# Patient Record
Sex: FEMALE
[De-identification: names, ages, dates, MRNs, and addresses within clinical notes are randomized per-mention and may not be internally consistent; named-entity substitution may affect disease eponyms.]

---

## 2018-03-12 LAB
ADD MANUAL DIFF: NO
ALBUMIN SERPL-MCNC: 4.1 G/DL (ref 3.4–5)
ALBUMIN/GLOB SERPL: 1.1 {RATIO} (ref 1–2.7)
ALP SERPL-CCNC: 89 U/L (ref 46–116)
ALT SERPL-CCNC: 34 U/L (ref 12–78)
ANION GAP SERPL CALC-SCNC: 6 MMOL/L (ref 5–15)
APPEARANCE UR: CLEAR
APTT BLD: 27 SEC (ref 23–33)
APTT PPP: (no result) S
AST SERPL-CCNC: 15 U/L (ref 15–37)
BASOPHILS NFR BLD AUTO: 0.9 % (ref 0–2)
BILIRUB SERPL-MCNC: 0.3 MG/DL (ref 0.2–1)
BUN SERPL-MCNC: 14 MG/DL (ref 7–18)
CALCIUM SERPL-MCNC: 9.6 MG/DL (ref 8.5–10.1)
CHLORIDE SERPL-SCNC: 102 MMOL/L (ref 98–107)
CO2 SERPL-SCNC: 32 MMOL/L (ref 21–32)
CREAT SERPL-MCNC: 0.9 MG/DL (ref 0.55–1.3)
EOSINOPHIL NFR BLD AUTO: 9.7 % (ref 0–3)
ERYTHROCYTE [DISTWIDTH] IN BLOOD BY AUTOMATED COUNT: 11.2 % (ref 11.6–14.8)
GLOBULIN SER-MCNC: 3.8 G/DL
GLUCOSE UR STRIP-MCNC: NEGATIVE MG/DL
HCT VFR BLD CALC: 48.3 % (ref 37–47)
HGB BLD-MCNC: 15.9 G/DL (ref 12–16)
INR PPP: 1 (ref 0.9–1.1)
KETONES UR QL STRIP: NEGATIVE
LEUKOCYTE ESTERASE UR QL STRIP: NEGATIVE
LYMPHOCYTES NFR BLD AUTO: 36.3 % (ref 20–45)
MCV RBC AUTO: 85 FL (ref 80–99)
MONOCYTES NFR BLD AUTO: 6.3 % (ref 1–10)
NEUTROPHILS NFR BLD AUTO: 46.8 % (ref 45–75)
NITRITE UR QL STRIP: NEGATIVE
PH UR STRIP: 5 [PH] (ref 4.5–8)
PLATELET # BLD: 357 K/UL (ref 150–450)
POTASSIUM SERPL-SCNC: 4.7 MMOL/L (ref 3.5–5.1)
PROT UR QL STRIP: NEGATIVE
RBC # BLD AUTO: 5.66 M/UL (ref 4.2–5.4)
SODIUM SERPL-SCNC: 140 MMOL/L (ref 136–145)
SP GR UR STRIP: 1.02 (ref 1–1.03)
UROBILINOGEN UR-MCNC: NORMAL MG/DL (ref 0–1)
WBC # BLD AUTO: 6.1 K/UL (ref 4.8–10.8)

## 2018-03-12 NOTE — DIAGNOSTIC IMAGING REPORT
Indication: Cough

 

Technique: 2 views of the chest

 

Comparison: None

 

Findings: Lungs and pleural spaces are clear. The heart size is normal.  The bones

are unremarkable. A tiny metallic circular object projects in the right anterior

chest wall soft tissues

 

Impression: Negative

## 2018-03-13 NOTE — CARDIOLOGY REPORT
--------------- APPROVED REPORT --------------





EKG Measurement

Heart [id number]

OH [id number]

[id number]

[id number]

[id number]





Normal sinus rhythm

Normal ECG

## 2018-03-20 NOTE — PRE-OP HX & PHY REPO 2 SIG
DATE OF ADMISSION:  03/21/2018



Scheduled for surgery March 21, 2018.



HISTORY OF PRESENT ILLNESS:  The patient is a 57-year-old  female,

in overall stable health with biopsy-proven invasive ductal carcinoma and

ductal carcinoma in situ of the right breast and a history of left nipple

discharge with blood.  She is scheduled to undergo bilateral total

mastectomy with right axillary lymph node biopsy.  She has a very strong

family history of breast cancer.  The patient presented earlier this year

with symptoms of an evoked left nipple discharge with blood for two years.

She also had a palpable mass in the right breast at 6 o'clock.

Mammography and ultrasound of the left breast was unremarkable.  On the

right breast, there was a suspicious mass in the periphery of the breast

at 6 o'clock.  Needle biopsy revealed invasive ductal carcinoma and ductal

carcinoma in situ, moderately differentiated.  Marker studies were

obtained revealing estrogen receptor and progesterone receptor positive

and HER2-negative.  In view of her strong family history, long discussion

was held with the patient and her daughter and decision was made to

perform bilateral total mastectomy and right axillary lymph node biopsy,

sentinel node biopsy.



PAST MEDICAL HISTORY AND MEDICATIONS:  Metformin, statin for elevated

cholesterol, and aspirin 81 mg daily.



ALLERGIES:  None.



OPERATIONS:  None.



PHYSICAL EXAMINATION:

VITAL SIGNS:  The patient is 5 foot 3 inches, 165 pounds with stable vital

signs.

HEENT:  Within normal limits.

LUNGS:  Clear.

HEART:  Regular rhythm.

BREASTS:  Examination of the breasts revealed the breasts are small in

size.  There is some bilateral thickening superior to the areola from 10

to _____  o'clock on both sides.  There is a 2 cm mass at the periphery of

the right breast at 6 o'clock.  There was no palpable axillary or

supraclavicular lymphadenopathy.

ABDOMEN:  Soft.

PELVIC:  Per primary care physician.

RECTAL:  Per primary care physician.

EXTREMITIES:  Without edema.

NEUROLOGIC:  Physiologic.



IMPRESSION:

1. Invasive ductal carcinoma and ductal carcinoma in situ of right

breast.

2. Left breast bloody nipple discharge.

3. Diabetes, controlled with oral agents.



PLAN:  The patient will undergo bilateral total mastectomy and right

axillary sentinel lymph node biopsy.  I have had a full discussion

regarding the nature of the surgery, potential for delayed reconstruction,

indications, alternatives, options, and risks including bleeding,

infection, the nature of the scarring, need for postoperative drains, need

for additional procedures, pending final pathology, etc.  All questions

have been answered.  The patient understands and agrees to proceed.









  ______________________________________________

  Stef Kraft M.D.





DR:  SULLY

D:  03/20/2018 09:04

T:  03/20/2018 09:15

JOB#:  [id number]

CC:

## 2018-03-21 ENCOUNTER — HOSPITAL ENCOUNTER (INPATIENT)
Dept: HOSPITAL 72 - SUR | Age: 58
LOS: 3 days | Discharge: HOME | DRG: 362 | End: 2018-03-24
Payer: MEDICAID

## 2018-03-21 VITALS — DIASTOLIC BLOOD PRESSURE: 82 MMHG | SYSTOLIC BLOOD PRESSURE: 126 MMHG

## 2018-03-21 VITALS — DIASTOLIC BLOOD PRESSURE: 76 MMHG | SYSTOLIC BLOOD PRESSURE: 120 MMHG

## 2018-03-21 VITALS — DIASTOLIC BLOOD PRESSURE: 71 MMHG | SYSTOLIC BLOOD PRESSURE: 122 MMHG

## 2018-03-21 VITALS — DIASTOLIC BLOOD PRESSURE: 73 MMHG | SYSTOLIC BLOOD PRESSURE: 111 MMHG

## 2018-03-21 VITALS — SYSTOLIC BLOOD PRESSURE: 126 MMHG | DIASTOLIC BLOOD PRESSURE: 82 MMHG

## 2018-03-21 VITALS — DIASTOLIC BLOOD PRESSURE: 55 MMHG | SYSTOLIC BLOOD PRESSURE: 102 MMHG

## 2018-03-21 VITALS — DIASTOLIC BLOOD PRESSURE: 79 MMHG | SYSTOLIC BLOOD PRESSURE: 136 MMHG

## 2018-03-21 VITALS — HEIGHT: 63 IN | BODY MASS INDEX: 28.35 KG/M2 | WEIGHT: 160 LBS

## 2018-03-21 VITALS — SYSTOLIC BLOOD PRESSURE: 112 MMHG | DIASTOLIC BLOOD PRESSURE: 82 MMHG

## 2018-03-21 VITALS — DIASTOLIC BLOOD PRESSURE: 75 MMHG | SYSTOLIC BLOOD PRESSURE: 135 MMHG

## 2018-03-21 VITALS — DIASTOLIC BLOOD PRESSURE: 76 MMHG | SYSTOLIC BLOOD PRESSURE: 128 MMHG

## 2018-03-21 VITALS — SYSTOLIC BLOOD PRESSURE: 136 MMHG | DIASTOLIC BLOOD PRESSURE: 80 MMHG

## 2018-03-21 VITALS — SYSTOLIC BLOOD PRESSURE: 124 MMHG | DIASTOLIC BLOOD PRESSURE: 80 MMHG

## 2018-03-21 VITALS — DIASTOLIC BLOOD PRESSURE: 74 MMHG | SYSTOLIC BLOOD PRESSURE: 113 MMHG

## 2018-03-21 DIAGNOSIS — Z80.3: ICD-10-CM

## 2018-03-21 DIAGNOSIS — Z79.84: ICD-10-CM

## 2018-03-21 DIAGNOSIS — N64.52: ICD-10-CM

## 2018-03-21 DIAGNOSIS — D05.11: Primary | ICD-10-CM

## 2018-03-21 DIAGNOSIS — E11.9: ICD-10-CM

## 2018-03-21 PROCEDURE — 87081 CULTURE SCREEN ONLY: CPT

## 2018-03-21 PROCEDURE — 36415 COLL VENOUS BLD VENIPUNCTURE: CPT

## 2018-03-21 PROCEDURE — 81003 URINALYSIS AUTO W/O SCOPE: CPT

## 2018-03-21 PROCEDURE — 93005 ELECTROCARDIOGRAM TRACING: CPT

## 2018-03-21 PROCEDURE — 85610 PROTHROMBIN TIME: CPT

## 2018-03-21 PROCEDURE — 94150 VITAL CAPACITY TEST: CPT

## 2018-03-21 PROCEDURE — 94003 VENT MGMT INPAT SUBQ DAY: CPT

## 2018-03-21 PROCEDURE — 82962 GLUCOSE BLOOD TEST: CPT

## 2018-03-21 PROCEDURE — 0HTV0ZZ RESECTION OF BILATERAL BREAST, OPEN APPROACH: ICD-10-PCS

## 2018-03-21 PROCEDURE — 71046 X-RAY EXAM CHEST 2 VIEWS: CPT

## 2018-03-21 PROCEDURE — 80053 COMPREHEN METABOLIC PANEL: CPT

## 2018-03-21 PROCEDURE — 86901 BLOOD TYPING SEROLOGIC RH(D): CPT

## 2018-03-21 PROCEDURE — 86850 RBC ANTIBODY SCREEN: CPT

## 2018-03-21 PROCEDURE — 86900 BLOOD TYPING SEROLOGIC ABO: CPT

## 2018-03-21 PROCEDURE — 85730 THROMBOPLASTIN TIME PARTIAL: CPT

## 2018-03-21 PROCEDURE — 85025 COMPLETE CBC W/AUTO DIFF WBC: CPT

## 2018-03-21 PROCEDURE — 07B50ZX EXCISION OF RIGHT AXILLARY LYMPHATIC, OPEN APPROACH, DIAGNOSTIC: ICD-10-PCS

## 2018-03-21 RX ADMIN — SODIUM CHLORIDE SCH MLS/HR: 0.9 INJECTION INTRAVENOUS at 16:12

## 2018-03-21 RX ADMIN — DIPHENHYDRAMINE HYDROCHLORIDE PRN MG: 50 INJECTION INTRAMUSCULAR; INTRAVENOUS at 21:50

## 2018-03-21 RX ADMIN — DIPHENHYDRAMINE HYDROCHLORIDE PRN MG: 50 INJECTION INTRAMUSCULAR; INTRAVENOUS at 13:07

## 2018-03-21 RX ADMIN — SODIUM CHLORIDE SCH MLS/HR: 0.9 INJECTION INTRAVENOUS at 23:42

## 2018-03-21 RX ADMIN — DIPHENHYDRAMINE HYDROCHLORIDE PRN MG: 50 INJECTION INTRAMUSCULAR; INTRAVENOUS at 17:08

## 2018-03-21 NOTE — 48 HOUR POST ANESTHESIA EVAL
Post Anesthesia Evaluation


Procedure:  Bilateral total breast masatectomy and right lymph node biopsy


Date of Evaluation:  Mar 21, 2018


Time of Evaluation:  09:42


Blood Pressure Systolic:  105


0:  52


Pulse Rate:  96


Respiratory Rate:  18


Temperature (Fahrenheit):  97.5


O2 Sat by Pulse Oximetry:  98


Airway:  patent


Nausea:  No


Vomiting:  No


Pain Intensity:  0


Hydration Status:  adequate


Cardiopulmonary Status:


at baseline


Mental Status/LOC:  patient returned to baseline


Post-Anesthesia Complications:


0


Follow-up care needed:  ready to discharge











RICKY HINDS M.D. Mar 21, 2018 08:07

## 2018-03-21 NOTE — BRIEF OPERATIVE NOTE
Immediate Post Operative Note


Operative Note


Pre-op Diagnosis:


carcinoma right breast; bloody left nipple discharge; high risk family history


Procedure:


bilateral mastectomy with right axillary lymph node biopsy


Post-op Diagnosis:


same


Post-op Diagnosis:  same as pre-op


Findings:  consistent w/pre-op dx studies


Surgeon:  catherine


Anesthesiologist:  [place]


Anesthesia:  general


Specimen:  yes - bilateral mastectomy and right axillary sentinel lymph node


Complications:  none


Condition:  stable


Fluids:  see anesthesia record


Estimated Blood Loss:  minimal


Drains:  TERI - Chuck drain 2 on right; 1 on left


Implant(s) used?:  KERRY Miller Mar 21, 2018 10:22

## 2018-03-21 NOTE — ANETHESIA PREOPERATIVE EVAL
Anesthesia Pre-op PMH/ROS


General


Date of Evaluation:  Mar 21, 2018


Anesthesiologist:  [place]


ASA Score:  ASA 3


Mallampati Score


Class I : Soft palate, uvula, fauces, pillars visible


Class II: Soft palate, uvula, fauces visible


Class III: Soft palate, base of uvula visible


Class IV: Only hard plate visible


Mallampati Classification:  Class II


Surgeon:  Abena


Diagnosis:  Right breast cancer


Surgical Procedure:  Bilateral total mastectomy


Anesthesia History:  none


Family History:  no anesthesia problems


Allergies:  


Coded Allergies:  


     No Known Allergies (Unverified , 3/20/18)


Medications:  see eMAR





Past Medical History


Cardiovascular:  Reports: other - HLD, 


   Denies: HTN, CAD, MI, valve dz, arrhythmia


Pulmonary:  Denies: asthma, COPD, ADRIANNA, other


Gastrointestinal/Genitourinary:  Denies: GERD, CRI, ESRD, other


Neurologic/Psychiatric:  Denies: dementia, CVA, depression/anxiety, TIA, other


Endocrine:  Reports: DM, 


   Denies: hypothyroidism, steroids, other


HEENT:  Denies: cataract (L), cataract (R), glaucoma, [place] (L), [place] (R), other


Hematology/Immune:  Reports: other - breast Cancer, 


   Denies: anemia, DVT, bleeding disorder


Musculoskeletal/Integumentary:  Denies: OA, RA, DJD, DDD, edema, other


PSxH Narrative:


CON





Anesthesia Pre-op Phys. Exam


Physician Exam





Last Vital Signs








  Date Time  Temp Pulse Resp B/P (MAP) Pulse Ox O2 Delivery O2 Flow Rate FiO2


 


3/21/18 06:04 97.7 73 20 136/80 95 Room Air  





 97.7       








Constitutional:  NAD


Cardiovascular:  RRR


Respiratory:  CTA





Airway Exam


Mallampati Score:  Class II


MO:  full


ROM:  full


Teeth:  intact





Anesthesia Pre-op A/P


Labs


see chart





Studies


Pre-op Studies:  EKG - sr





Risk Assessment & Plan


Assessment:


ASA III


Plan:


GA


Status Change Before Surgery:  No





Pre-Antibiotics


Drug:  Ancef 2g


Given Within 1 Hr of Incision:  Yes


Time Given:  07:35











RICKY HINDS M.D. Mar 21, 2018 07:15

## 2018-03-21 NOTE — PRE-PROCEDURE NOTE/ATTESTATION
Pre-Procedure Note/Attestation


Complete Prior to Procedure


Planned Procedure:  bilateral


Procedure Narrative:


bilateral mastectomy and right axillary lymph node biopsy





Indications for Procedure


Pre-Operative Diagnosis:


carcinoma right breast; bloody left nipple discharge; high risk family history





Attestation


I attest that I discussed the nature of the procedure; its benefits; risks and 

complications; and alternatives (and the risks and benefits of such alternatives

), prior to the procedure, with the patient (or the patient's legal 

representative).





I attest that, if there was a reasonable possibility of needing a blood 

transfusion, the patient (or the patient's legal representative) was given the 

[facility] standardized written summary, pursuant 

to the [place] Blood Safety Act ([place] Health and Safety Code # 1645, as 

amended).





I attest that I re-evaluated the patient just prior to the surgery and that 

there has been no change in the patient's H&P, except as documented below:none











KERRY INTERIANO Mar 21, 2018 07:22

## 2018-03-21 NOTE — OPERATIVE NOTE - DICTATED
DATE OF OPERATION:  03/21/2018



SURGEON:  Stef Kraft M.D.



ASSISTANT SURGEON:  None.



ANESTHESIOLOGIST:  Dr. Marcial.



TYPE OF ANESTHESIA:  General endotracheal.



PREOPERATIVE DIAGNOSES:

1. Invasive ductal carcinoma and ductal carcinoma in situ, right

breast.

2. History of left breast bloody nipple discharge with negative imaging

studies and exam.

3. Strong family history for breast cancer.



POSTOPERATIVE DIAGNOSES:

1. Invasive ductal carcinoma and ductal carcinoma in situ, right

breast.

2. History of left breast bloody nipple discharge with negative imaging

studies and exam.

3. Strong family history for breast cancer.



OPERATION PERFORMED:  Bilateral total mastectomy with right axillary

sentinel lymph node biopsy.



DESCRIPTION OF PROCEDURE:  The patient was taken to the operating room and

under general endotracheal anesthesia with sequential compression device

stockings in place and received intravenous antibiotics, the patient was

prepped and draped in usual fashion.  Attention was then first directed to

the right breast.  The patient had small breasts bilaterally.  The

carcinoma in the right breast was a 2 cm mass at the inframammary fold in

the periphery of the right breast at 6 o'clock.  An elliptical transverse

incision was made encompassing the nipple-areolar complex from the lateral

border of the sternum to the anterior axillary line.  Flaps were dissected

circumferentially.  The inferior flap was made thin because of the

underlying mass.  Flaps were dissected to the sternum, second rib costal

margin, and latissimus dorsi.  Orienting sutures were placed with the

pathologist marking medial and superior.  The breast was resected

including the pectoralis fascia and given to the pathologist, who inked

the specimen and observed that the margin around the tumor was

satisfactory.  Dissecting right axilla revealed a small lymph node, which

was excised with some of the axillary fat pad, achieving hemostasis with

medium clips and cautery.  Two 19-mm round Chuck drains were placed

through stab incisions inferior and lateral and one placed onto the flaps

and the other into the axilla both were sutured to the skin with 2-0 silk

skin sutures.  The field was copiously irrigated and hemostasis was

secured.  The incision was closed with interrupted 2-0 Vicryl deep dermal

subcutaneous sutures followed by staples.  The Hernando-Savage drains were

placed on bulb suction with good apposition of the flaps.  Then, gloves

were changed and the instrumentation was changed and the left side was

addressed.  A transverse elliptical incision was made and flaps dissected

circumferentially and the breast removed with pectoralis fascia.  There

was no indication for axillary lymph node biopsy on this side.  Hemostasis

carefully achieved with cautery.  A 19 mm round Chuck drain placed through

a stab incision inferior and lateral and sutured to the skin with 2-0

silk, it was placed under the flaps.  After ascertaining the hemostasis

was secured, the incision was closed with interrupted 2-0 Vicryl deep

dermal subcutaneous sutures followed by skin staples.  The drain was

placed on bulb suction with good apposition of the flaps.  Dry sterile

dressings were applied.  Final sponge and needle counts were correct.  The

patient tolerated the procedure well and left the operating room in stable

condition.









  ______________________________________________

  Stef Kraft M.D.





DR:  SULLY

D:  03/21/2018 10:46

T:  03/21/2018 14:49

JOB#:  [id number]

CC:

## 2018-03-21 NOTE — IMMEDIATE POST-OP EVALUATION
Immediate Post-Op Evalulation


Immediate Post-Op Evalulation


Procedure:  Bilateral total breast masatectomy and right lymph node biopsy


Date of Evaluation:  Mar 21, 2018


Time of Evaluation:  10:21


IV Fluids:  1.5L


Blood Products:  0


Estimated Blood Loss:  25


Urinary Output:  0


Blood Pressure Systolic:  124


Blood Pressure Diastolic:  80


Pulse Rate:  69


Respiratory Rate:  17


O2 Sat by Pulse Oximetry:  98


Temperature (Fahrenheit):  97.2


Pain Score (1-10):  0


Nausea:  No


Vomiting:  No


Complications


0


Patient Status:  awake, reacts, patent, none


Hydration Status:  adequate


Drug:  Ancef 2g


Given Within 1 Hr of Incision:  Yes


Time Given:  07:35











RICKY HINDS M.D. Mar 21, 2018 08:06

## 2018-03-22 VITALS — DIASTOLIC BLOOD PRESSURE: 70 MMHG | SYSTOLIC BLOOD PRESSURE: 131 MMHG

## 2018-03-22 VITALS — SYSTOLIC BLOOD PRESSURE: 138 MMHG | DIASTOLIC BLOOD PRESSURE: 77 MMHG

## 2018-03-22 VITALS — DIASTOLIC BLOOD PRESSURE: 57 MMHG | SYSTOLIC BLOOD PRESSURE: 94 MMHG

## 2018-03-22 VITALS — SYSTOLIC BLOOD PRESSURE: 128 MMHG | DIASTOLIC BLOOD PRESSURE: 64 MMHG

## 2018-03-22 VITALS — DIASTOLIC BLOOD PRESSURE: 62 MMHG | SYSTOLIC BLOOD PRESSURE: 127 MMHG

## 2018-03-22 RX ADMIN — KETOROLAC TROMETHAMINE PRN MG: 30 INJECTION, SOLUTION INTRAMUSCULAR; INTRAVENOUS at 21:46

## 2018-03-22 RX ADMIN — HYDROCODONE BITARTRATE AND ACETAMINOPHEN PRN TAB: 5; 325 TABLET ORAL at 17:52

## 2018-03-22 RX ADMIN — KETOROLAC TROMETHAMINE PRN MG: 30 INJECTION, SOLUTION INTRAMUSCULAR; INTRAVENOUS at 15:04

## 2018-03-22 RX ADMIN — METFORMIN HYDROCHLORIDE SCH MG: 500 TABLET ORAL at 08:54

## 2018-03-22 RX ADMIN — DIPHENHYDRAMINE HYDROCHLORIDE PRN MG: 50 INJECTION INTRAMUSCULAR; INTRAVENOUS at 02:05

## 2018-03-22 RX ADMIN — DIPHENHYDRAMINE HYDROCHLORIDE PRN MG: 50 INJECTION INTRAMUSCULAR; INTRAVENOUS at 07:37

## 2018-03-22 RX ADMIN — METFORMIN HYDROCHLORIDE SCH MG: 500 TABLET ORAL at 17:51

## 2018-03-22 NOTE — GENERAL PROGRESS NOTE
Progress Note


Progress Note


AVSS c/o pain receiving Dilaudid SQ. Has been out of bed to void only. Not on 

oxygen overnight despite orders. Barely ate breakfast


bilateral mastectomy incisions clean with [id number] patch of ischemic skin mid-

incision lower flap right side (very thin flap due to cancer at infra-mammary 

crease and small breast.


I&0 okay with 70cc TERI drainage (not recorded by drain)


Imp: Pain post-op poorly controlled


Plan; Toradol IV


        Ambulate with assistance TID


        continue oxygen therapy another 24 hours


        not in condition to be discharged


        record TERI output q4h each drain separately











KERRY INTERIANO Mar 22, 2018 08:21

## 2018-03-23 VITALS — DIASTOLIC BLOOD PRESSURE: 66 MMHG | SYSTOLIC BLOOD PRESSURE: 133 MMHG

## 2018-03-23 VITALS — SYSTOLIC BLOOD PRESSURE: 108 MMHG | DIASTOLIC BLOOD PRESSURE: 47 MMHG

## 2018-03-23 VITALS — SYSTOLIC BLOOD PRESSURE: 154 MMHG | DIASTOLIC BLOOD PRESSURE: 61 MMHG

## 2018-03-23 VITALS — DIASTOLIC BLOOD PRESSURE: 59 MMHG | SYSTOLIC BLOOD PRESSURE: 143 MMHG

## 2018-03-23 VITALS — DIASTOLIC BLOOD PRESSURE: 75 MMHG | SYSTOLIC BLOOD PRESSURE: 138 MMHG

## 2018-03-23 VITALS — SYSTOLIC BLOOD PRESSURE: 154 MMHG | DIASTOLIC BLOOD PRESSURE: 78 MMHG

## 2018-03-23 RX ADMIN — OXYCODONE HYDROCHLORIDE AND ACETAMINOPHEN PRN TAB: 5; 325 TABLET ORAL at 21:22

## 2018-03-23 RX ADMIN — KETOROLAC TROMETHAMINE PRN MG: 30 INJECTION, SOLUTION INTRAMUSCULAR; INTRAVENOUS at 16:21

## 2018-03-23 RX ADMIN — DOCUSATE SODIUM SCH MG: 100 CAPSULE, LIQUID FILLED ORAL at 10:41

## 2018-03-23 RX ADMIN — METFORMIN HYDROCHLORIDE SCH MG: 500 TABLET ORAL at 18:43

## 2018-03-23 RX ADMIN — KETOROLAC TROMETHAMINE PRN MG: 30 INJECTION, SOLUTION INTRAMUSCULAR; INTRAVENOUS at 04:05

## 2018-03-23 RX ADMIN — METFORMIN HYDROCHLORIDE SCH MG: 500 TABLET ORAL at 08:56

## 2018-03-23 RX ADMIN — KETOROLAC TROMETHAMINE PRN MG: 30 INJECTION, SOLUTION INTRAMUSCULAR; INTRAVENOUS at 22:49

## 2018-03-23 RX ADMIN — DOCUSATE SODIUM SCH MG: 100 CAPSULE, LIQUID FILLED ORAL at 18:43

## 2018-03-23 RX ADMIN — DOCUSATE SODIUM SCH MG: 100 CAPSULE, LIQUID FILLED ORAL at 13:48

## 2018-03-23 RX ADMIN — HYDROCODONE BITARTRATE AND ACETAMINOPHEN PRN TAB: 5; 325 TABLET ORAL at 09:06

## 2018-03-23 RX ADMIN — KETOROLAC TROMETHAMINE PRN MG: 30 INJECTION, SOLUTION INTRAMUSCULAR; INTRAVENOUS at 10:22

## 2018-03-23 NOTE — GENERAL PROGRESS NOTE
Progress Note


Progress Note


AVSS c/o pain unrelieved by Norco.  Ambulates well but not eating well (dietary 

issues). She is tearful also


Incisions clean and dry, small 2x3cm patch ischemic skin right mid-lower flap 

at incision. 


TERI drains: left: 12cc/12 hours overnight


               right:  19cc + 22cc (overnight - 2 drains)


Imp: Slowly improving pain management issue


Plan: d/c Myra - start Percocet 5/325


        d/c IV fluids


        adjust diet


        teach patient/family re care of TERI drains


        toradol IV prn breakthrough pain


        anticipate discharge in AM


        family given Rx for Percocet #24 for home use post-discharge











KERRY INTERIANO Mar 23, 2018 10:32

## 2018-03-24 VITALS — SYSTOLIC BLOOD PRESSURE: 162 MMHG | DIASTOLIC BLOOD PRESSURE: 86 MMHG

## 2018-03-24 VITALS — DIASTOLIC BLOOD PRESSURE: 98 MMHG | SYSTOLIC BLOOD PRESSURE: 160 MMHG

## 2018-03-24 VITALS — SYSTOLIC BLOOD PRESSURE: 126 MMHG | DIASTOLIC BLOOD PRESSURE: 71 MMHG

## 2018-03-24 VITALS — DIASTOLIC BLOOD PRESSURE: 64 MMHG | SYSTOLIC BLOOD PRESSURE: 129 MMHG

## 2018-03-24 VITALS — DIASTOLIC BLOOD PRESSURE: 70 MMHG | SYSTOLIC BLOOD PRESSURE: 150 MMHG

## 2018-03-24 RX ADMIN — DOCUSATE SODIUM SCH MG: 100 CAPSULE, LIQUID FILLED ORAL at 09:20

## 2018-03-24 RX ADMIN — KETOROLAC TROMETHAMINE PRN MG: 30 INJECTION, SOLUTION INTRAMUSCULAR; INTRAVENOUS at 06:06

## 2018-03-24 RX ADMIN — KETOROLAC TROMETHAMINE PRN MG: 30 INJECTION, SOLUTION INTRAMUSCULAR; INTRAVENOUS at 12:33

## 2018-03-24 RX ADMIN — DOCUSATE SODIUM SCH MG: 100 CAPSULE, LIQUID FILLED ORAL at 12:29

## 2018-03-24 RX ADMIN — OXYCODONE HYDROCHLORIDE AND ACETAMINOPHEN PRN TAB: 5; 325 TABLET ORAL at 02:45

## 2018-03-24 RX ADMIN — METFORMIN HYDROCHLORIDE SCH MG: 500 TABLET ORAL at 09:20

## 2018-03-24 NOTE — GENERAL PROGRESS NOTE
Progress Note


Progress Note


Much improved with manageable pain without parenteral meds


Incisions clean and dry bilaterally; ischemic area right inferior flap is 

smaller


TERI (12hrs overnight) 11; 15 and 15 (#s 1-3)


Imp: Doing well


Plan:  discharge with supplies - patient and daughter have been taught re TERI 

drain care and recording outputs


         instructions/limitations discussed


         f/u Clinic 3/29 - call KERRY Haines Mar 24, 2018 09:03

## 2018-08-05 ENCOUNTER — HOSPITAL ENCOUNTER (EMERGENCY)
Dept: HOSPITAL 72 - EMR | Age: 58
Discharge: HOME | End: 2018-08-05
Payer: MEDICAID

## 2018-08-05 VITALS — BODY MASS INDEX: 27.11 KG/M2 | HEIGHT: 63 IN | WEIGHT: 153 LBS

## 2018-08-05 VITALS — SYSTOLIC BLOOD PRESSURE: 143 MMHG | DIASTOLIC BLOOD PRESSURE: 76 MMHG

## 2018-08-05 VITALS — DIASTOLIC BLOOD PRESSURE: 65 MMHG | SYSTOLIC BLOOD PRESSURE: 127 MMHG

## 2018-08-05 VITALS — HEIGHT: 63 IN | BODY MASS INDEX: 27.11 KG/M2 | WEIGHT: 153 LBS

## 2018-08-05 DIAGNOSIS — R21: ICD-10-CM

## 2018-08-05 DIAGNOSIS — T78.40XA: ICD-10-CM

## 2018-08-05 DIAGNOSIS — L50.9: ICD-10-CM
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
## 2018-08-05 DIAGNOSIS — I10: ICD-10-CM

## 2018-08-05 DIAGNOSIS — Z90.13: ICD-10-CM

## 2018-08-05 DIAGNOSIS — T78.40XA: Primary | ICD-10-CM

## 2018-08-05 DIAGNOSIS — E11.9: ICD-10-CM

## 2018-08-05 DIAGNOSIS — R07.89: Primary | ICD-10-CM

## 2018-08-05 PROCEDURE — 96372 THER/PROPH/DIAG INJ SC/IM: CPT

## 2018-08-05 PROCEDURE — 99283 EMERGENCY DEPT VISIT LOW MDM: CPT

## 2018-08-05 PROCEDURE — 93005 ELECTROCARDIOGRAM TRACING: CPT

## 2018-08-05 NOTE — EMERGENCY ROOM REPORT
History of Present Illness


General


Chief Complaint:  Skin Rash/Abscess


Source:  Patient





Present Illness


HPI


58-year-old female presents ED complaining of allergic reaction.  Daughter at 

bedside states the symptoms started on Friday night shortly after eating some 

type food.  States she had a rash from head to toe which was very itchy.  Was 

seen in ER last night.  Was given medications which resolved the symptoms and 

was subsequently discharged.  Daughter admits that they did not fill the 

prescriptions and states that the symptoms returned.  Also notes some lip 

swelling.  Denies any tongue swelling or throat tightness.  Denies any chest 

pain or shortness of breath.  Denies any known food or drug allergies.  History 

of bilateral mastectomy in March 2018 and currently undergoing chemotherapy.  

States the only new medication is dexamethasone.  States she also has exposure 

to cats and takes Claritin for that.  No other aggravating relieving factors.  

Denies any other associated symptoms


Allergies:  


Coded Allergies:  


     No Known Allergies (Unverified , 3/20/18)





Patient History


Past Medical History:  DM, HTN


Past Surgical History:  other - bilateral mastectomy


Pertinent Family History:  none


Social History:  Denies: smoking, alcohol use, drug use


Pregnant Now:  No


Immunizations:  UTD


Reviewed Nursing Documentation:  PMH: Agreed; PSxH: Agreed





Nursing Documentation-PMH


Hx Cardiac Problems:  Yes


Hx Hypertension:  Yes


Hx Diabetes:  Yes - PRE-DIABETIC


Hx Cancer:  Yes - BILATERAL MASTECTOMY,MARCH 2018


Hx Gastrointestinal Problems:  No


Hx Neurological Problems:  No





Review of Systems


All Other Systems:  negative except mentioned in HPI





Physical Exam





Vital Signs








  Date Time  Temp Pulse Resp B/P (MAP) Pulse Ox O2 Delivery O2 Flow Rate FiO2


 


8/5/18 19:50 97.7 96 16 185/111 94 Room Air  





 97.7       








Sp02 EP Interpretation:  reviewed, normal


General Appearance:  no apparent distress, alert, GCS 15, non-toxic


Head:  normocephalic, atraumatic


Eyes:  bilateral eye normal inspection, bilateral eye PERRL


ENT:  hearing grossly normal, normal pharynx, no angioedema, normal voice, 

other - minimal lip swelling


Neck:  full range of motion, supple/symm/no masses, other - no stridor


Respiratory:  chest non-tender, lungs clear, normal breath sounds, speaking 

full sentences


Cardiovascular #1:  regular rate, rhythm, no edema


Cardiovascular #2:  2+ carotid (R), 2+ carotid (L), 2+ radial (R), 2+ radial (L)

, 2+ dorsalis pedis (R), 2+ dorsalis pedis (L)


Gastrointestinal:  normal bowel sounds, non tender, soft, non-distended, no 

guarding, no rebound


Rectal:  deferred


Genitourinary:  normal inspection, no CVA tenderness


Musculoskeletal:  back normal, gait/station normal, normal range of motion, non-

tender


Neurologic:  alert, oriented x3, responsive, motor strength/tone normal, 

sensory intact, speech normal


Psychiatric:  judgement/insight normal, memory normal, mood/affect normal, no 

suicidal/homicidal ideation


Reflexes:  3+ bicep (R), 3+ bicep (L), 3+ tricep (R), 3+ tricep (L), 3+ knee (R)

, 3+ knee (L)


Skin:  normal color, warm/dry, well hydrated, other - urticarial rash, diffuse


Lymphatic:  no adenopathy





Medical Decision Making


Diagnostic Impression:  


 Primary Impression:  


 Allergic reaction


 Qualified Codes:  T78.40XA - Allergy, unspecified, initial encounter


ER Course


Hospital Course 


58-year-old female presents with rash, lip swelling.





Differential diagnoses include: allergic reaction, angioedema 





Clinical course


Patient placed on stretcher.  Cardiac monitor.  After initial history and 

physical, I ordered her viewed EMR.  Patient was seen here yesterday for 

similar symptoms.  Was given Solu-Medrol IM and subsequently discharged after 

resolution 





Patient and daughter admit that they did not fill the prescriptions, did not 

know that they were supposed to continue the medications.  





No known food or drug allergies.  Possible sources could be the type food 

ingested prior to onset of symptoms, they continued exposure to cats, the 

dexamethasone which is new 





Patient also states that she takes lisinopril.  This is a consideration however 

unlikely given that there is also a rash.  Nevertheless I did tell the patient 

to discontinue the lisinopril until she sees her PMD 





here patient given Solu-Medrol, Benadryl, pepcid





On reassessment there is resolution of symptoms.  Patient feels better and 

wishes to be discharged.  I encouraged patient to continue the prescriptions as 

directed.  Close follow-up with PMD





i.  I feel this is a highly complex case requiring extensive working including 

EKG/Rhythm strip, Xray/CT/US, Blood/urine lab work, repeat exams while in ED, 

and administration of strong opiates/narcotics for pain control, admission to 

hospital or close patient follow up.  





Diagnosis - allergic reaction 





Stable and discharged to home with prescriptions for Zantac, Benadryl.  

Followup with PMD.  Return to ED if symptoms recur or worsen





Last Vital Signs








  Date Time  Temp Pulse Resp B/P (MAP) Pulse Ox O2 Delivery O2 Flow Rate FiO2


 


8/5/18 20:59 97.7 96 16 143/76 94 Room Air  





 97.7       








Status:  improved


Disposition:  HOME, SELF-CARE


Condition:  Stable


Scripts


Ranitidine Hcl* (ZANTAC*) 150 Mg Tablet


150 MG ORAL TWICE A DAY for 5 Days, #30 TAB


   Prov: Dimitris Zapata MD         8/5/18 


Diphenhydramine Hcl* (DIPHENHYDRAMINE HCL*) 25 Mg Capsule


25 MG ORAL Q6H PRN for Itching for 5 Days, #30 CAP 0 Refills


   Prov: Dimitris Zapata MD         8/5/18


Patient Instructions:  Food Allergy, Easy-to-Read











Dimitris Zapata MD Aug 5, 2018 21:10

## 2018-08-05 NOTE — EMERGENCY ROOM REPORT
History of Present Illness


General


Chief Complaint:  Chest Pain





Present Illness


HPI


This patient has two problems. One is constant, low-level anterior thoracic 

chest wall pain for weeks-month? That is not new and not reason she is here. 

She is here b/c of diffuse rash that is itchy. Face, chest, arms, legs. No 

similar history. No new exposures. No trauma, no fever, no shortness of breath, 

no nausea, no vomiting, no diarrhea, no abdominal pain, no syncope, LOC, 

dizziness, lightheadedness, headache. 





March 2018 breast cancer and surgery


June 2018 chemotherapy started


Allergies:  


Coded Allergies:  


     No Known Allergies (Unverified , 3/20/18)





Nursing Documentation-PMH


Hx Cardiac Problems:  Yes


Hx Hypertension:  Yes


Hx Diabetes:  Yes - PRE-DIABETIC


Hx Cancer:  Yes - BILATERAL MASTECTOMY,MARCH 2018


Hx Gastrointestinal Problems:  No


Hx Neurological Problems:  No





Review of Systems


Constitutional:  Reports: no symptoms


Eye:  Reports: no symptoms


ENT:  Reports: no symptoms


Respiratory:  Reports: no symptoms


Cardiovascular:  Reports: no symptoms


Gastrointestinal:  Reports: no symptoms


Genitourinary:  Reports: no symptoms


Musculoskeletal:  Reports: no symptoms


Skin:  Reports: no symptoms


Psychiatric:  Reports: no symptoms


Neurological:  Reports: no symptoms


Endocrine:  Reports: no symptoms


Hematologic/Lymphatic:  Reports: no symptoms


Allergic:  Reports: no symptoms





Physical Exam





Vital Signs








  Date Time  Temp Pulse Resp B/P (MAP) Pulse Ox O2 Delivery O2 Flow Rate FiO2


 


8/5/18 01:46 98.3 86 18  97 Room Air  





 98.2       


 


8/5/18 02:09    127/65    








Sp02 EP Interpretation:  reviewed, normal


General Appearance:  normal inspection, well appearing, no apparent distress, 

alert, GCS 15, non-toxic


Head:  normocephalic, atraumatic


Eyes:  bilateral eye normal inspection, bilateral eye PERRL, bilateral eye EOMI


ENT:  normal ENT inspection, hearing grossly normal, normal pharynx, no 

angioedema, normal voice, moist mucus membranes


Neck:  normal inspection, full range of motion, supple, no meningismus, no bony 

tend


Respiratory:  normal inspection, lungs clear, normal breath sounds, no rhonchi, 

no respiratory distress, no retraction, no accessory muscle use, no wheezing, 

other - s/p bilat mastectomy


Cardiovascular #1:  normal inspection, regular rate, rhythm, no edema


Gastrointestinal:  normal inspection, normal bowel sounds, non tender, soft, no 

mass, non-distended


Musculoskeletal:  gait/station normal, normal range of motion


Neurologic:  normal inspection, alert, oriented x3, responsive, motor strength/

tone normal


Psychiatric:  normal inspection, judgement/insight normal, memory normal


Suicide Risk Assessment:  


   Suicidal Ideation:  No


   Had intent to initiate attempt:  No


   Pt's plan for suicide attempt:  No


   Has means to complete attempt:  No


Skin:  normal color, warm/dry, other - urticaria face, chest, upper extrem





Medical Decision Making


Diagnostic Impression:  


 Primary Impression:  


 Allergic reaction


ER Course


almost complete resolution s/p Solu-medrol





unknown etiology





Last Vital Signs








  Date Time  Temp Pulse Resp B/P (MAP) Pulse Ox O2 Delivery O2 Flow Rate FiO2


 


8/5/18 02:50 98.3       


 


8/5/18 02:09  81 16 127/65 100 Room Air  








Disposition:  HOME, SELF-CARE


Condition:  Improved


Scripts


Prednisone* (PREDNISONE*) 20 Mg Tablet


40 MG ORAL DAILY, #5 TAB


   Prov: Reynaldo Cosme M.D.         8/5/18


Referrals:  


NON PHYSICIAN (PCP)


Patient Instructions:  Allergies, Easy-to-Read











Reynaldo Cosme M.D. Aug 5, 2018 03:59

## 2018-08-05 NOTE — CARDIOLOGY REPORT
--------------- APPROVED REPORT --------------





EKG Measurement

Heart [id number]

MT [id number]

[id number]

[id number]

[id number]





Normal sinus rhythm

Normal ECG

## 2022-04-07 ENCOUNTER — HOSPITAL ENCOUNTER (EMERGENCY)
Dept: HOSPITAL 87 - ER | Age: 62
Discharge: HOME | End: 2022-04-07
Payer: COMMERCIAL

## 2022-04-07 VITALS — WEIGHT: 143.3 LBS | BODY MASS INDEX: 25.39 KG/M2 | HEIGHT: 63 IN

## 2022-04-07 VITALS — SYSTOLIC BLOOD PRESSURE: 140 MMHG | DIASTOLIC BLOOD PRESSURE: 80 MMHG

## 2022-04-07 DIAGNOSIS — M25.512: ICD-10-CM

## 2022-04-07 DIAGNOSIS — M54.12: Primary | ICD-10-CM

## 2022-04-07 PROCEDURE — 96372 THER/PROPH/DIAG INJ SC/IM: CPT

## 2022-04-07 PROCEDURE — 99284 EMERGENCY DEPT VISIT MOD MDM: CPT

## 2022-04-07 PROCEDURE — 72050 X-RAY EXAM NECK SPINE 4/5VWS: CPT

## 2022-04-07 PROCEDURE — 73030 X-RAY EXAM OF SHOULDER: CPT
